# Patient Record
Sex: MALE | Race: OTHER | Employment: PART TIME | URBAN - METROPOLITAN AREA
[De-identification: names, ages, dates, MRNs, and addresses within clinical notes are randomized per-mention and may not be internally consistent; named-entity substitution may affect disease eponyms.]

---

## 2024-02-15 PROBLEM — Z00.00 ROUTINE GENERAL MEDICAL EXAMINATION AT A HEALTH CARE FACILITY: Status: ACTIVE | Noted: 2024-02-15

## 2024-02-15 PROBLEM — N20.0 NEPHROLITHIASIS: Status: ACTIVE | Noted: 2024-02-15

## 2024-02-20 PROBLEM — K58.0 IRRITABLE BOWEL SYNDROME WITH DIARRHEA: Status: ACTIVE | Noted: 2024-02-20

## 2024-05-02 PROBLEM — K58.0 IRRITABLE BOWEL SYNDROME WITH DIARRHEA: Status: ACTIVE | Noted: 2024-02-20

## 2024-05-02 PROBLEM — H52.13 MYOPIA OF BOTH EYES: Status: ACTIVE | Noted: 2023-05-17

## 2024-05-02 PROBLEM — N20.0 NEPHROLITHIASIS: Status: ACTIVE | Noted: 2024-02-15

## 2024-05-02 PROBLEM — H35.413 BILATERAL RETINAL LATTICE DEGENERATION: Status: ACTIVE | Noted: 2023-05-17

## 2024-05-02 PROBLEM — H52.4 PRESBYOPIA: Status: ACTIVE | Noted: 2023-05-17

## 2024-05-02 PROBLEM — H52.222 REGULAR ASTIGMATISM OF LEFT EYE: Status: ACTIVE | Noted: 2023-05-17

## 2024-05-02 PROBLEM — H35.9 RETINAL DISORDER: Status: ACTIVE | Noted: 2023-07-17

## 2024-05-15 PROBLEM — D12.4 BENIGN NEOPLASM OF DESCENDING COLON: Status: ACTIVE | Noted: 2024-05-15

## 2024-05-15 PROBLEM — Z12.11 SPECIAL SCREENING FOR MALIGNANT NEOPLASM OF COLON: Status: ACTIVE | Noted: 2024-05-15

## 2024-05-15 PROBLEM — D12.0 BENIGN NEOPLASM OF CECUM: Status: ACTIVE | Noted: 2024-05-15

## 2024-05-15 PROBLEM — D12.5 BENIGN NEOPLASM OF SIGMOID COLON: Status: ACTIVE | Noted: 2024-05-15

## 2024-05-15 PROBLEM — D12.2 BENIGN NEOPLASM OF ASCENDING COLON: Status: ACTIVE | Noted: 2024-05-15

## 2024-10-22 ENCOUNTER — OFFICE VISIT (OUTPATIENT)
Facility: LOCATION | Age: 49
End: 2024-10-22
Payer: COMMERCIAL

## 2024-10-22 VITALS
OXYGEN SATURATION: 99 % | BODY MASS INDEX: 20.83 KG/M2 | WEIGHT: 125 LBS | DIASTOLIC BLOOD PRESSURE: 69 MMHG | TEMPERATURE: 98 F | HEART RATE: 66 BPM | HEIGHT: 65 IN | SYSTOLIC BLOOD PRESSURE: 104 MMHG

## 2024-10-22 DIAGNOSIS — K64.2 PROLAPSED INTERNAL HEMORRHOIDS, GRADE 3: Primary | ICD-10-CM

## 2024-10-31 ENCOUNTER — OFFICE VISIT (OUTPATIENT)
Facility: LOCATION | Age: 49
End: 2024-10-31
Payer: COMMERCIAL

## 2024-10-31 VITALS
HEART RATE: 66 BPM | WEIGHT: 125 LBS | OXYGEN SATURATION: 98 % | DIASTOLIC BLOOD PRESSURE: 67 MMHG | SYSTOLIC BLOOD PRESSURE: 105 MMHG | TEMPERATURE: 98 F | BODY MASS INDEX: 20.83 KG/M2 | HEIGHT: 65 IN

## 2024-10-31 DIAGNOSIS — K64.2 PROLAPSED INTERNAL HEMORRHOIDS, GRADE 3: Primary | ICD-10-CM

## 2024-10-31 NOTE — PROCEDURES
Pre op diagnosis: Internal Hemorrhoids    Post op diagnosis: Same    Procedure: Anoscopy with O-ring Rubber Band Ligation of Internal Hemorrhoids    Surgeon: Teodoro Ortiz MD     History of present illness:   This patient has internal hemorrhoids that are symptomatic    Operative findings:   The right anterior internal hemorrhoid was successfully ligated in the standard fashion with an O-ring ligator.      Operative summary:  The patient was draped and exposed in the usual fashion.    A medical assistant was present at all times.    External visualization of the perineum and gluteal cleft was performed.    Digital exam was performed with a well lubricated examining finger.    Diagnostic Anoscopy was performed with lubrication.    The anal canal was well visualized.    An internal hemorrhoid was identified and well visualized.    The internal hemorrhoid was grasped well above the dentate line with the grasper.    The internal hemorrhoid was pulled within the O-ring ligator.    The O-ring ligator was fired without complication.    A 5% phenol solution was injected into the hemorrhoid and at its base.    The patient tolerated the procedure and was observed in our office for at least 5 minutes prior to discharge.    All findings are listed in the physical exam section of this note.

## 2024-11-14 ENCOUNTER — OFFICE VISIT (OUTPATIENT)
Facility: LOCATION | Age: 49
End: 2024-11-14
Payer: COMMERCIAL

## 2024-11-14 VITALS
SYSTOLIC BLOOD PRESSURE: 99 MMHG | OXYGEN SATURATION: 97 % | BODY MASS INDEX: 20.83 KG/M2 | WEIGHT: 125 LBS | HEIGHT: 65 IN | TEMPERATURE: 98 F | DIASTOLIC BLOOD PRESSURE: 62 MMHG | HEART RATE: 79 BPM

## 2024-11-14 DIAGNOSIS — K64.2 PROLAPSED INTERNAL HEMORRHOIDS, GRADE 3: Primary | ICD-10-CM

## 2024-11-14 PROBLEM — E78.49 OTHER HYPERLIPIDEMIA: Status: ACTIVE | Noted: 2024-11-14

## 2024-11-14 PROCEDURE — 46221 LIGATION OF HEMORRHOID(S): CPT | Performed by: STUDENT IN AN ORGANIZED HEALTH CARE EDUCATION/TRAINING PROGRAM

## 2024-11-14 PROCEDURE — 3074F SYST BP LT 130 MM HG: CPT | Performed by: STUDENT IN AN ORGANIZED HEALTH CARE EDUCATION/TRAINING PROGRAM

## 2024-11-14 PROCEDURE — 3078F DIAST BP <80 MM HG: CPT | Performed by: STUDENT IN AN ORGANIZED HEALTH CARE EDUCATION/TRAINING PROGRAM

## 2024-11-14 PROCEDURE — 3008F BODY MASS INDEX DOCD: CPT | Performed by: STUDENT IN AN ORGANIZED HEALTH CARE EDUCATION/TRAINING PROGRAM

## 2024-11-14 RX ORDER — TRAMADOL HYDROCHLORIDE 50 MG/1
50 TABLET ORAL EVERY 6 HOURS PRN
Qty: 5 TABLET | Refills: 0 | Status: SHIPPED | OUTPATIENT
Start: 2024-11-14

## 2024-11-14 NOTE — PROCEDURES
Pre op diagnosis: Internal Hemorrhoids    Post op diagnosis: Same    Procedure: Anoscopy with O-ring Rubber Band Ligation of Internal Hemorrhoids    Surgeon: Teodoro Ortiz MD     History of present illness:   This patient has internal hemorrhoids that are symptomatic    Operative findings:   The left lateral internal hemorrhoid was successfully ligated in the standard fashion with an O-ring ligator.      Operative summary:  The patient was draped and exposed in the usual fashion.    A medical assistant was present at all times.    External visualization of the perineum and gluteal cleft was performed.    Digital exam was performed with a well lubricated examining finger.    Diagnostic Anoscopy was performed with lubrication.    The anal canal was well visualized.    An internal hemorrhoid was identified and well visualized.    The internal hemorrhoid was grasped well above the dentate line with the grasper.    The internal hemorrhoid was pulled within the O-ring ligator.    The O-ring ligator was fired without complication.    A 5% phenol solution was injected into the hemorrhoid and at its base.    The patient tolerated the procedure and was observed in our office for at least 5 minutes prior to discharge.    All findings are listed in the physical exam section of this note.

## 2024-12-05 ENCOUNTER — OFFICE VISIT (OUTPATIENT)
Facility: LOCATION | Age: 49
End: 2024-12-05
Payer: COMMERCIAL

## 2024-12-05 VITALS
OXYGEN SATURATION: 96 % | DIASTOLIC BLOOD PRESSURE: 71 MMHG | WEIGHT: 125 LBS | TEMPERATURE: 98 F | HEART RATE: 60 BPM | SYSTOLIC BLOOD PRESSURE: 109 MMHG | HEIGHT: 65 IN | BODY MASS INDEX: 20.83 KG/M2

## 2024-12-05 DIAGNOSIS — K64.2 PROLAPSED INTERNAL HEMORRHOIDS, GRADE 3: Primary | ICD-10-CM

## 2024-12-05 PROCEDURE — 46221 LIGATION OF HEMORRHOID(S): CPT | Performed by: STUDENT IN AN ORGANIZED HEALTH CARE EDUCATION/TRAINING PROGRAM

## 2024-12-05 PROCEDURE — 3074F SYST BP LT 130 MM HG: CPT | Performed by: STUDENT IN AN ORGANIZED HEALTH CARE EDUCATION/TRAINING PROGRAM

## 2024-12-05 PROCEDURE — 3008F BODY MASS INDEX DOCD: CPT | Performed by: STUDENT IN AN ORGANIZED HEALTH CARE EDUCATION/TRAINING PROGRAM

## 2024-12-05 PROCEDURE — 3078F DIAST BP <80 MM HG: CPT | Performed by: STUDENT IN AN ORGANIZED HEALTH CARE EDUCATION/TRAINING PROGRAM

## 2024-12-05 NOTE — PROCEDURES
Pre op diagnosis: Internal Hemorrhoids    Post op diagnosis: Same    Procedure: Anoscopy with O-ring Rubber Band Ligation of Internal Hemorrhoids    Surgeon: Teodoro Ortiz MD     History of present illness:   This patient has internal hemorrhoids that are symptomatic    Operative findings:   The right posterior internal hemorrhoid was successfully ligated in the standard fashion with an O-ring ligator.      Operative summary:  The patient was draped and exposed in the usual fashion.    A medical assistant was present at all times.    External visualization of the perineum and gluteal cleft was performed.    Digital exam was performed with a well lubricated examining finger.    Diagnostic Anoscopy was performed with lubrication.    The anal canal was well visualized.    An internal hemorrhoid was identified and well visualized.    The internal hemorrhoid was grasped well above the dentate line with the grasper.    The internal hemorrhoid was pulled within the O-ring ligator.    The O-ring ligator was fired without complication.    A 5% phenol solution was injected into the hemorrhoid and at its base.    The patient tolerated the procedure and was observed in our office for at least 5 minutes prior to discharge.    All findings are listed in the physical exam section of this note.

## 2024-12-19 ENCOUNTER — OFFICE VISIT (OUTPATIENT)
Facility: LOCATION | Age: 49
End: 2024-12-19
Payer: COMMERCIAL

## 2024-12-19 VITALS
OXYGEN SATURATION: 97 % | HEIGHT: 65 IN | SYSTOLIC BLOOD PRESSURE: 98 MMHG | TEMPERATURE: 98 F | HEART RATE: 70 BPM | WEIGHT: 125 LBS | BODY MASS INDEX: 20.83 KG/M2 | DIASTOLIC BLOOD PRESSURE: 61 MMHG

## 2024-12-19 DIAGNOSIS — K64.2 PROLAPSED INTERNAL HEMORRHOIDS, GRADE 3: Primary | ICD-10-CM

## 2024-12-19 PROCEDURE — 3078F DIAST BP <80 MM HG: CPT | Performed by: STUDENT IN AN ORGANIZED HEALTH CARE EDUCATION/TRAINING PROGRAM

## 2024-12-19 PROCEDURE — 46221 LIGATION OF HEMORRHOID(S): CPT | Performed by: STUDENT IN AN ORGANIZED HEALTH CARE EDUCATION/TRAINING PROGRAM

## 2024-12-19 PROCEDURE — 3008F BODY MASS INDEX DOCD: CPT | Performed by: STUDENT IN AN ORGANIZED HEALTH CARE EDUCATION/TRAINING PROGRAM

## 2024-12-19 PROCEDURE — 3074F SYST BP LT 130 MM HG: CPT | Performed by: STUDENT IN AN ORGANIZED HEALTH CARE EDUCATION/TRAINING PROGRAM

## 2024-12-19 NOTE — PROGRESS NOTES
Follow Up Visit Note       Active Problems      1. Prolapsed internal hemorrhoids, grade 3          Chief Complaint   Chief Complaint   Patient presents with    Follow - Up     EP - f/u after 3rd banding, no symptoms.           History of Present Illness  49 year old male who is following up with me for evaluation and treatment of internal hemorrhoids. He reports improvement in his symptoms. He feels a bulge on the left side that is smaller than before the banding. This is mildly symptomatic and causes him discomfort. He denies any bleeding. He has no other symptoms.       Allergies  Drake has No Known Allergies.    Past Medical / Surgical / Social / Family History    The past medical and past surgical history have been reviewed by me today.    Past Medical History:    Abdominal hernia    Alcohol abuse    Back pain    Calculus of kidney    Eye disease    Fatigue    Hearing loss    Hemorrhoids    High cholesterol    Pancreatitis (HCC)    Uncomfortable fullness after meals     History reviewed. No pertinent surgical history.    The family history and social history have been reviewed by me today.    History reviewed. No pertinent family history.  Social History     Socioeconomic History    Marital status:    Tobacco Use    Smoking status: Former     Current packs/day: 0.00     Average packs/day: 1 pack/day for 5.0 years (5.0 ttl pk-yrs)     Types: Cigarettes     Start date: 2007     Quit date: 2012     Years since quittin.9    Smokeless tobacco: Never   Substance and Sexual Activity    Alcohol use: Not Currently     Comment: jose m    Drug use: No        Current Outpatient Medications:     traMADol 50 MG Oral Tab, Take 1 tablet (50 mg total) by mouth every 6 (six) hours as needed for Pain. 1 tablet by mouth every 4-6 hours as needed for pain., Disp: 5 tablet, Rfl: 0    atorvastatin 10 MG Oral Tab, Take 1 tablet (10 mg total) by mouth daily., Disp: , Rfl:      Review of Systems  The Review of  Systems has been reviewed by me during today.  Review of Systems   Constitutional:  Negative for chills, diaphoresis, fatigue and fever.   HENT:  Negative for ear discharge, ear pain and sore throat.    Eyes:  Negative for pain and discharge.   Respiratory:  Negative for cough, chest tightness and shortness of breath.    Cardiovascular:  Negative for chest pain, palpitations and leg swelling.   Gastrointestinal:  Positive for rectal pain. Negative for abdominal distention, abdominal pain, blood in stool, constipation, diarrhea, nausea and vomiting.   Genitourinary:  Negative for dysuria, frequency, hematuria and urgency.   Skin:  Negative for color change, pallor and rash.   Neurological:  Negative for weakness, light-headedness, numbness and headaches.   Hematological:  Negative for adenopathy. Does not bruise/bleed easily.   Psychiatric/Behavioral:  Negative for agitation and confusion.         Physical Findings   BP 98/61 (BP Location: Right arm, Patient Position: Sitting, Cuff Size: adult)   Pulse 70   Temp 98.4 °F (36.9 °C) (Temporal)   Ht 65\"   Wt 125 lb (56.7 kg)   SpO2 97%   BMI 20.80 kg/m²   Physical Exam  Constitutional:       Appearance: Normal appearance.   HENT:      Head: Normocephalic and atraumatic.   Cardiovascular:      Pulses: Normal pulses.   Pulmonary:      Effort: Pulmonary effort is normal.   Skin:     General: Skin is warm.      Capillary Refill: Capillary refill takes less than 2 seconds.   Neurological:      Mental Status: He is alert and oriented to person, place, and time. Mental status is at baseline.     The perineum and perianal skin were examined.   There was No abnormality.        Digital rectal exam was performed. There was  good resting anal sphincter tone.        Anoscopy was performed. The anal canal and anorectal ring were examined circumferentially.   There was not anal fissure.  There was not anal fistula internal opening.   There was internal hemorrhoid enlargement that  are medium in size. There is a sizable residual left lateral hemorrhoid. There are well healed scars of prior banding on the right anterior and right posterior columns with little to no residual hemorrhoid.   There was not blood or mucus seen.               Assessment/Plan  1. Prolapsed internal hemorrhoids, grade 3        Drake Chawla is a 49 year old male referred by Britton Patel MD for evaluation of hemorrhoids. He underwent 3 sessions of hemorrhoidal banding. He reports significant improvement in his symptoms but continues to have discomfort with prolapsing small residual hemorrhoid . On exam there is a residual sizable left sided internal hemorrhoid. Rubber band ligation performed to this column x2 today to take care of all residual disease. Follow up in 2 weeks to evaluate symptoms.            Teodoro Ortiz MD

## 2025-03-05 ENCOUNTER — TELEPHONE (OUTPATIENT)
Facility: LOCATION | Age: 50
End: 2025-03-05

## 2025-03-05 NOTE — TELEPHONE ENCOUNTER
Patient called to speak with nurse. He stated that he has a thrombosed hemorrhoid and is in constant pain.     Please advise   # [phone]

## 2025-03-05 NOTE — TELEPHONE ENCOUNTER
Spoke with patient.  Scheduled patient visit with surgeon.  Patient advised may try over the counter medications for pain and may try sitz baths for additional comfort.  Patient verbalized understanding.  Future Appointments   Date Time Provider Department Center   3/6/2025  3:00 PM [id number]

## 2025-03-06 ENCOUNTER — OFFICE VISIT (OUTPATIENT)
Facility: LOCATION | Age: 50
End: 2025-03-06
Payer: COMMERCIAL

## 2025-03-06 VITALS
SYSTOLIC BLOOD PRESSURE: 109 MMHG | RESPIRATION RATE: 20 BRPM | DIASTOLIC BLOOD PRESSURE: 73 MMHG | OXYGEN SATURATION: 98 % | TEMPERATURE: 98 F | HEART RATE: 73 BPM

## 2025-03-06 DIAGNOSIS — K64.4 EXTERNAL HEMORRHOIDS: Primary | ICD-10-CM

## 2025-03-06 PROCEDURE — 3074F SYST BP LT 130 MM HG: CPT | Performed by: STUDENT IN AN ORGANIZED HEALTH CARE EDUCATION/TRAINING PROGRAM

## 2025-03-06 PROCEDURE — 99212 OFFICE O/P EST SF 10 MIN: CPT | Performed by: STUDENT IN AN ORGANIZED HEALTH CARE EDUCATION/TRAINING PROGRAM

## 2025-03-06 PROCEDURE — 46083 INC THROMBOSED HROID XTRNL: CPT | Performed by: STUDENT IN AN ORGANIZED HEALTH CARE EDUCATION/TRAINING PROGRAM

## 2025-03-06 PROCEDURE — 3078F DIAST BP <80 MM HG: CPT | Performed by: STUDENT IN AN ORGANIZED HEALTH CARE EDUCATION/TRAINING PROGRAM

## 2025-03-06 RX ORDER — HYDROCODONE BITARTRATE AND ACETAMINOPHEN 5; 325 MG/1; MG/1
1 TABLET ORAL EVERY 6 HOURS PRN
Qty: 7 TABLET | Refills: 0 | Status: SHIPPED | OUTPATIENT
Start: 2025-03-06

## 2025-03-06 NOTE — PROGRESS NOTES
Follow Up Visit Note       Active Problems      1. External hemorrhoids          Chief Complaint   Chief Complaint   Patient presents with    [facility]     EP-Hemorrhoids- possible thrombosed hemorrhoids with pain x1 week, denies bleeding          History of Present Illness  49 year old male presents today for evaluation of possibly thrombosed external hemorrhoids. He reports that he has been struggling with pain for the last week on the left side. He denies any bleeding, just the pain. He has been taking an increase in fiber supplements, but he is still experiencing some of the pain.    Allergies  Drake has No Known Allergies.    Past Medical / Surgical / Social / Family History    The past medical and past surgical history have been reviewed by me today.    Past Medical History:    Abdominal hernia    Alcohol abuse    Back pain    Calculus of kidney    Eye disease    Fatigue    Hearing loss    Hemorrhoids    High cholesterol    Pancreatitis (HCC)    Uncomfortable fullness after meals     History reviewed. No pertinent surgical history.    The family history and social history have been reviewed by me today.    History reviewed. No pertinent family history.  Social History     Socioeconomic History    Marital status:    Tobacco Use    Smoking status: Former     Current packs/day: 0.00     Average packs/day: 1 pack/day for 5.0 years (5.0 ttl pk-yrs)     Types: Cigarettes     Start date: 2007     Quit date: 2012     Years since quittin.2    Smokeless tobacco: Never   Substance and Sexual Activity    Alcohol use: Not Currently     Comment: jose m    Drug use: No        Current Outpatient Medications:     HYDROcodone-acetaminophen 5-325 MG Oral Tab, Take 1 tablet by mouth every 6 (six) hours as needed for Pain., Disp: 7 tablet, Rfl: 0    atorvastatin 10 MG Oral Tab, Take 1 tablet (10 mg total) by mouth daily., Disp: , Rfl:     traMADol 50 MG Oral Tab, Take 1 tablet (50 mg total) by mouth every  6 (six) hours as needed for Pain. 1 tablet by mouth every 4-6 hours as needed for pain. (Patient not taking: Reported on 3/20/2025), Disp: 5 tablet, Rfl: 0     Review of Systems  The Review of Systems has been reviewed by me during today.  Review of Systems   Constitutional: Negative.    HENT: Negative.     Eyes: Negative.    Respiratory: Negative.     Cardiovascular: Negative.    Gastrointestinal:  Positive for rectal pain.   Genitourinary: Negative.    Musculoskeletal: Negative.    Skin: Negative.    Neurological: Negative.    Psychiatric/Behavioral: Negative.          Physical Findings   /73   Pulse 73   Temp 98 °F (36.7 °C) (Temporal)   Resp 20   SpO2 98%   Physical Exam  Constitutional:       Appearance: He is well-developed.   Cardiovascular:      Rate and Rhythm: Normal rate and regular rhythm.      Heart sounds: Normal heart sounds.   Pulmonary:      Effort: Pulmonary effort is normal.      Breath sounds: Normal breath sounds.   Abdominal:      General: Bowel sounds are normal.      Palpations: Abdomen is soft.   Skin:     General: Skin is warm and dry.   Neurological:      Mental Status: He is alert and oriented to person, place, and time.      Deep Tendon Reflexes: Reflexes are normal and symmetric.              The perineum and perianal skin were examined.   There was thrombosed external hemorrhoids.    One enlarged, thrombosed external hemorrhoid in the left lateral position that is tender. I performed a bedside I&D. There was no evidence of anal fissure, abscess, mucus or bleeding.    Anoscopy was no performed.     Assessment/Plan  1. External hemorrhoids        Drake Chawla is a 49 year old male referred for evaluation of external hemorrhoids. Patient complains of tender, inflamed external hemorrhoid. Examination demonstrated enlarged, thrombosed external hemorrhoid. I performed a bedside I&D after obtaining informed consent. Patient tolerated well I reviewed care instructions for after the  procedure including BID dressing changes for the first 2 to 3 days and sitz baths. I ordered Norco as needed every 6 hours and encouraged him to utilize this and Advil as needed for pain management . Patient verbalized understanding of instructions. He will follow up in 2 weeks for recheck.         No orders of the defined types were placed in this encounter.      Imaging & Referrals   None    Follow Up  No follow-ups on file.    Teodoro Ortiz MD    The documentation for this encounter was entered by Marcelina Aguirre acting as a Medical Scribe for Dr. Ortiz.    All medical record entries made by Scribe were at my direction and personally dictated by me. I have reviewed the chart and agree that the record accurately reflects my personal performance of the history, physical exam, assessment and plan. I have personally directed, reviewed, and agree with the instructions.     CC  Britton Patel MD

## 2025-03-10 ENCOUNTER — TELEPHONE (OUTPATIENT)
Facility: LOCATION | Age: 50
End: 2025-03-10

## 2025-03-10 NOTE — TELEPHONE ENCOUNTER
Spoke with patient. Patient advise will continue to see the hemorrhoid tissue but to continue with sitz baths as instructed and to follow up with Dr. Ortiz 3/20/25 as scheduled  Patient verbalized understanding.

## 2025-03-10 NOTE — TELEPHONE ENCOUNTER
The patient recently called to speak to a nurse regarding the in office procedure with the hemorrhoid. The patient states that his symptoms are pain, inflammation, and the swelling has not decreased in size.     Call back # [phone]

## 2025-03-20 ENCOUNTER — OFFICE VISIT (OUTPATIENT)
Facility: LOCATION | Age: 50
End: 2025-03-20
Payer: COMMERCIAL

## 2025-03-20 VITALS
HEART RATE: 72 BPM | TEMPERATURE: 98 F | DIASTOLIC BLOOD PRESSURE: 65 MMHG | SYSTOLIC BLOOD PRESSURE: 101 MMHG | OXYGEN SATURATION: 96 %

## 2025-03-20 DIAGNOSIS — K64.4 EXTERNAL HEMORRHOIDS: Primary | ICD-10-CM

## 2025-03-20 PROCEDURE — 3078F DIAST BP <80 MM HG: CPT | Performed by: STUDENT IN AN ORGANIZED HEALTH CARE EDUCATION/TRAINING PROGRAM

## 2025-03-20 PROCEDURE — 3074F SYST BP LT 130 MM HG: CPT | Performed by: STUDENT IN AN ORGANIZED HEALTH CARE EDUCATION/TRAINING PROGRAM

## 2025-03-20 PROCEDURE — 99212 OFFICE O/P EST SF 10 MIN: CPT | Performed by: STUDENT IN AN ORGANIZED HEALTH CARE EDUCATION/TRAINING PROGRAM

## 2025-03-20 NOTE — PROGRESS NOTES
Follow Up Visit Note       Active Problems      1. External hemorrhoids          Chief Complaint   Chief Complaint   Patient presents with    Follow - Up     EP - 2-wk f/u after I&D of thrombosed hemorrhoid, pt reports no symptoms.         History of Present Illness  49 year old male presents today for follow up after I&D of thrombosed hemorrhoid 3/6/2025. He notes that he is still having a little bit of tenderness to the area, but he has definitely noticed a decrease in the size of the hemorrhoids and believes he is healing well.      Allergies  Drake has No Known Allergies.    Past Medical / Surgical / Social / Family History    The past medical and past surgical history have been reviewed by me today.    Past Medical History:    Abdominal hernia    Alcohol abuse    Back pain    Calculus of kidney    Eye disease    Fatigue    Hearing loss    Hemorrhoids    High cholesterol    Pancreatitis (HCC)    Uncomfortable fullness after meals     History reviewed. No pertinent surgical history.    The family history and social history have been reviewed by me today.    History reviewed. No pertinent family history.  Social History     Socioeconomic History    Marital status:    Tobacco Use    Smoking status: Former     Current packs/day: 0.00     Average packs/day: 1 pack/day for 5.0 years (5.0 ttl pk-yrs)     Types: Cigarettes     Start date: 2007     Quit date: 2012     Years since quittin.2    Smokeless tobacco: Never   Substance and Sexual Activity    Alcohol use: Not Currently     Comment: jose m    Drug use: No        Current Outpatient Medications:     HYDROcodone-acetaminophen 5-325 MG Oral Tab, Take 1 tablet by mouth every 6 (six) hours as needed for Pain., Disp: 7 tablet, Rfl: 0    atorvastatin 10 MG Oral Tab, Take 1 tablet (10 mg total) by mouth daily., Disp: , Rfl:     traMADol 50 MG Oral Tab, Take 1 tablet (50 mg total) by mouth every 6 (six) hours as needed for Pain. 1 tablet by mouth every  4-6 hours as needed for pain. (Patient not taking: Reported on 3/20/2025), Disp: 5 tablet, Rfl: 0     Review of Systems  The Review of Systems has been reviewed by me during today.  Review of Systems   Constitutional: Negative.    HENT: Negative.     Eyes: Negative.    Respiratory: Negative.     Cardiovascular: Negative.    Gastrointestinal: Negative.    Genitourinary: Negative.    Musculoskeletal: Negative.    Skin: Negative.    Neurological: Negative.    Psychiatric/Behavioral: Negative.          Physical Findings   /65 (BP Location: Right arm, Patient Position: Sitting, Cuff Size: adult)   Pulse 72   Temp 97.8 °F (36.6 °C) (Temporal)   SpO2 96%   Physical Exam  Constitutional:       Appearance: He is well-developed.   Cardiovascular:      Rate and Rhythm: Normal rate and regular rhythm.      Heart sounds: Normal heart sounds.   Pulmonary:      Effort: Pulmonary effort is normal.      Breath sounds: Normal breath sounds.   Abdominal:      General: Bowel sounds are normal.      Palpations: Abdomen is soft.      Tenderness: There is no abdominal tenderness. There is no guarding or rebound.      Hernia: No hernia is present.   Skin:     General: Skin is warm and dry.   Neurological:      Mental Status: He is alert and oriented to person, place, and time.      Deep Tendon Reflexes: Reflexes are normal and symmetric.              The perineum and perianal skin were examined.   There was healed incision in the left lateral position with a shrinking external hemorrhoid that is slightly tender to palpation.    Assessment/Plan  1. External hemorrhoids        Drake Chawla is a 49 year old male referred for evaluation of external hemorrhoids. Examination showed well healed incision from incision and drainage at last visit that is improving greatly. I reviewed that there will continue to be healing through the next 6 weeks. I emphasized the importance of following a high fiber diet. He will follow up on an as needed  basis.       No orders of the defined types were placed in this encounter.      Imaging & Referrals   None    Follow Up  No follow-ups on file.    Teodoro Ortiz MD    The documentation for this encounter was entered by Marcelina Aguirre acting as a Medical Scribe for Dr. Ortiz.    All medical record entries made by Scribe were at my direction and personally dictated by me. I have reviewed the chart and agree that the record accurately reflects my personal performance of the history, physical exam, assessment and plan. I have personally directed, reviewed, and agree with the instructions.     CC  Britton Patel MD

## 2025-03-31 NOTE — PROCEDURES
[facility]    Drake Chawla Patient Status:  No patient class for patient encounter    1975 MRN [id number]   Location [facility] Attending No att. providers found   Hosp Day # 0 PCP Britton Patel MD     Drake Chawla is a 49 year old male patient.  1. External hemorrhoids      Past Medical History:    Abdominal hernia    Alcohol abuse    Back pain    Calculus of kidney    Eye disease    Fatigue    Hearing loss    Hemorrhoids    High cholesterol    Pancreatitis (HCC)    Uncomfortable fullness after meals     Blood pressure 109/73, pulse 73, temperature 98 °F (36.7 °C), temperature source Temporal, resp. rate 20, SpO2 98%.    Incision and Drainage    Date/Time: 3/31/2025 8:47 AM    Performed by: Teodoro Ortiz MD  Authorized by: Teodoro Ortiz MD    Consent:     Consent obtained:  Verbal  Universal protocol:     Patient identity confirmed:  Verbally with patient  Location:     Type:  External thrombosed hemorrhoid    Location:  Anogenital    Anogenital location:  Perianal  Pre-procedure details:     Skin preparation:  Povidone-iodine  Sedation:     Sedation type:  None  Anesthesia:     Anesthesia method:  Local infiltration    Local anesthetic:  Lidocaine 1% WITH epi  Procedure type:     Complexity:  Simple  Procedure details:     Incision types:  Elliptical    Incision depth:  Subcutaneous    Wound management:  Irrigated with saline    Drainage:  Bloody    Drainage amount:  Moderate    Wound treatment:  Wound left open    Packing materials:  None  Post-procedure details:     Procedure completion:  Tolerated well, no immediate complications      Teodoro Ortiz MD  3/31/2025

## 2025-05-21 PROBLEM — F41.1 GENERALIZED ANXIETY DISORDER: Status: ACTIVE | Noted: 2025-05-21

## 2025-05-21 PROBLEM — G44.209 ACUTE NON INTRACTABLE TENSION-TYPE HEADACHE: Status: ACTIVE | Noted: 2025-05-21

## 2025-07-28 ENCOUNTER — IMAGING SERVICES (OUTPATIENT)
Dept: GENERAL RADIOLOGY | Age: 50
End: 2025-07-28
Attending: INTERNAL MEDICINE

## 2025-07-28 DIAGNOSIS — M17.11 PRIMARY OSTEOARTHRITIS OF RIGHT KNEE: ICD-10-CM

## 2025-07-28 PROCEDURE — 73564 X-RAY EXAM KNEE 4 OR MORE: CPT | Performed by: RADIOLOGY

## (undated) NOTE — LETTER
Patient Name: Drake Chawla        : 1975       Medical Record #: [id number]    CONSENT FOR PROCEDURES/SEDATION    Date: 2024       Time: 9:00 AM        1. I authorize the performance upon Drake Chawla the following:    ___________HEMORRHOID BANDING__________________________________________    2. I authorize Dr. Ortiz (and whomever is designated as the doctor’s assistant), to perform the above mentioned procedures.    3. If any unforeseen conditions arise during this procedure calling for additional procedures, operations, or medications (including anesthesia and blood transfusion), I  further request and authorize the doctor to do whatever he/she deems advisable in my interest.    4. I consent to the taking and reproduction of any photographs in the course of this procedure for professional purposes.    5. I consent to the administration of such sedation as may be considered necessary or advisable by the physician responsible for this service, with the exception of  _____________________________.    6. I have been informed by my doctor of the nature and purpose of this procedure/sedation, possible alternative methods of treatment, risk involved and possible complications.      Signature of Patient:  ___________________________    Signature of person authorized to consent for patient: Relationship to patient:  ___________________________    ___________________    Witness: ____________________     Date: ______________    Provider: ____________________     Date: ______________